# Patient Record
Sex: FEMALE | Race: BLACK OR AFRICAN AMERICAN | NOT HISPANIC OR LATINO
[De-identification: names, ages, dates, MRNs, and addresses within clinical notes are randomized per-mention and may not be internally consistent; named-entity substitution may affect disease eponyms.]

---

## 2020-05-07 ENCOUNTER — APPOINTMENT (OUTPATIENT)
Dept: PULMONOLOGY | Facility: CLINIC | Age: 65
End: 2020-05-07

## 2020-07-20 PROBLEM — Z00.00 ENCOUNTER FOR PREVENTIVE HEALTH EXAMINATION: Status: ACTIVE | Noted: 2020-07-20

## 2020-07-22 ENCOUNTER — APPOINTMENT (OUTPATIENT)
Dept: PULMONOLOGY | Facility: CLINIC | Age: 65
End: 2020-07-22

## 2020-08-08 ENCOUNTER — LABORATORY RESULT (OUTPATIENT)
Age: 65
End: 2020-08-08

## 2020-08-12 ENCOUNTER — APPOINTMENT (OUTPATIENT)
Dept: PULMONOLOGY | Facility: CLINIC | Age: 65
End: 2020-08-12
Payer: COMMERCIAL

## 2020-08-12 VITALS
HEART RATE: 92 BPM | HEIGHT: 62 IN | WEIGHT: 184 LBS | TEMPERATURE: 98.3 F | SYSTOLIC BLOOD PRESSURE: 118 MMHG | DIASTOLIC BLOOD PRESSURE: 78 MMHG | BODY MASS INDEX: 33.86 KG/M2 | OXYGEN SATURATION: 98 %

## 2020-08-12 DIAGNOSIS — Z86.59 PERSONAL HISTORY OF OTHER MENTAL AND BEHAVIORAL DISORDERS: ICD-10-CM

## 2020-08-12 DIAGNOSIS — Z87.19 PERSONAL HISTORY OF OTHER DISEASES OF THE DIGESTIVE SYSTEM: ICD-10-CM

## 2020-08-12 DIAGNOSIS — Z82.5 FAMILY HISTORY OF ASTHMA AND OTHER CHRONIC LOWER RESPIRATORY DISEASES: ICD-10-CM

## 2020-08-12 DIAGNOSIS — Z91.09 OTHER ALLERGY STATUS, OTHER THAN TO DRUGS AND BIOLOGICAL SUBSTANCES: ICD-10-CM

## 2020-08-12 PROCEDURE — 94010 BREATHING CAPACITY TEST: CPT

## 2020-08-12 PROCEDURE — 99205 OFFICE O/P NEW HI 60 MIN: CPT | Mod: 25

## 2020-08-12 RX ORDER — AZELASTINE HYDROCHLORIDE 205.5 UG/1
0.15 SPRAY, METERED NASAL
Qty: 1 | Refills: 5 | Status: ACTIVE | COMMUNITY
Start: 2020-08-12 | End: 1900-01-01

## 2020-08-12 RX ORDER — CALCIUM CARBONATE 500(1250)
TABLET,CHEWABLE ORAL
Refills: 0 | Status: ACTIVE | COMMUNITY

## 2020-08-12 RX ORDER — LEVOCETIRIZINE DIHYDROCHLORIDE 5 MG/1
5 TABLET ORAL
Qty: 30 | Refills: 5 | Status: ACTIVE | COMMUNITY
Start: 2020-08-12 | End: 1900-01-01

## 2020-08-12 NOTE — ASSESSMENT
[FreeTextEntry1] : Data reviewed:\par \par PA/lat CXR LHR 2/2020 personally reviewed : clear lungs\par \par [place] 08/12/2020 : poor quality but grossly normal\par \par Impression:\par Nasal congestion, allergies\par Nocturnal symptoms due to above vs asthma vs RJ\par No evidence of PH\par \par Plan:\par The first step here seems to be to put her back on Xyzal, and I will add Astelin, to treat her nasal symptoms.\par If that doesn't relieve her nocturnal symptoms, then I can bring her back for full PFT/FENO and also do a HST.\par If there is any further information suggesting PH, would ask that the referring physician forward it to me. I see no evidence that she has PH. She has excellent exercise tolerance and reports an echo.

## 2020-08-12 NOTE — CONSULT LETTER
[Dear  ___] : Dear  [unfilled], [( Thank you for referring [unfilled] for consultation for _____ )] : Thank you for referring [unfilled] for consultation for [unfilled] [Please see my note below.] : Please see my note below. [Consult Closing:] : Thank you very much for allowing me to participate in the care of this patient.  If you have any questions, please do not hesitate to contact me. [Sincerely,] : Sincerely, [FreeTextEntry3] : Yuli Bales MD, FCCP\par  [FreeTextEntry2] : Caren Boss MD\par [address] [facility]\par [place] [id number]

## 2020-08-12 NOTE — PHYSICAL EXAM
[No Acute Distress] : no acute distress [Normal Appearance] : normal appearance [Normal S1, S2] : normal s1, s2 [Normal Rate/Rhythm] : normal rate/rhythm [No Resp Distress] : no resp distress [No Murmurs] : no murmurs [Benign] : benign [Clear to Auscultation Bilaterally] : clear to auscultation bilaterally [No Edema] : no edema [No Clubbing] : no clubbing [Normal Color/ Pigmentation] : normal color/ pigmentation [Normal Affect] : normal affect

## 2020-08-12 NOTE — HISTORY OF PRESENT ILLNESS
[TextBox_4] : 08/12/2020: Asked to evaluate patient by Dr Boss ([address] [place] [address] in [place]) for pulmonary hypertension. No records are provided; no indication of why she is thought to have PH. Her predominant complaint is of nasal congestion; she's previously seen ENT and received IT for her allergies as well as Xyzal. She did not tolerate Flonase. She complains of epigastric pressure and a sensation of trouble breathing inside her throat. She will sometimes wake up in the night with nasal congestion and dyspnea. She did have an echo and stress test w Dr Troncoso and was told that these were fine. She walks all over the city without dyspnea, she climbs stairs without dyspnea, she exercises. She does have reflux and a hiatal hernia. She will cough occasionally, doesn't wheeze. No childhood asthma. Smoked some until her 30s. Has vaped last year. Lots of anxiety, mom is very sick.\par

## 2021-03-15 ENCOUNTER — LABORATORY RESULT (OUTPATIENT)
Age: 66
End: 2021-03-15

## 2021-03-16 ENCOUNTER — APPOINTMENT (OUTPATIENT)
Dept: PULMONOLOGY | Facility: CLINIC | Age: 66
End: 2021-03-16
Payer: COMMERCIAL

## 2021-03-16 VITALS
HEART RATE: 100 BPM | WEIGHT: 200 LBS | BODY MASS INDEX: 36.8 KG/M2 | HEIGHT: 62 IN | TEMPERATURE: 98.7 F | OXYGEN SATURATION: 98 % | SYSTOLIC BLOOD PRESSURE: 118 MMHG | DIASTOLIC BLOOD PRESSURE: 88 MMHG

## 2021-03-16 PROCEDURE — 94727 GAS DIL/WSHOT DETER LNG VOL: CPT

## 2021-03-16 PROCEDURE — 94060 EVALUATION OF WHEEZING: CPT

## 2021-03-16 PROCEDURE — 94729 DIFFUSING CAPACITY: CPT

## 2021-03-16 PROCEDURE — 99072 ADDL SUPL MATRL&STAF TM PHE: CPT

## 2021-03-16 PROCEDURE — 99213 OFFICE O/P EST LOW 20 MIN: CPT | Mod: 25

## 2021-03-16 PROCEDURE — 95012 NITRIC OXIDE EXP GAS DETER: CPT

## 2021-03-16 RX ORDER — BUSPIRONE HYDROCHLORIDE 15 MG/1
15 TABLET ORAL
Qty: 180 | Refills: 0 | Status: ACTIVE | COMMUNITY
Start: 2021-01-29

## 2021-03-16 RX ORDER — FAMOTIDINE 40 MG/1
40 TABLET, FILM COATED ORAL
Qty: 30 | Refills: 0 | Status: ACTIVE | COMMUNITY
Start: 2020-10-01

## 2021-03-16 RX ORDER — BUSPIRONE HYDROCHLORIDE 10 MG/1
10 TABLET ORAL
Qty: 90 | Refills: 0 | Status: ACTIVE | COMMUNITY
Start: 2020-09-23

## 2021-03-16 NOTE — CONSULT LETTER
[Dear  ___] : Dear  [unfilled], [Courtesy Letter:] : I had the pleasure of seeing your patient, [unfilled], in my office today. [Please see my note below.] : Please see my note below. [Consult Closing:] : Thank you very much for allowing me to participate in the care of this patient.  If you have any questions, please do not hesitate to contact me. [Sincerely,] : Sincerely, [FreeTextEntry2] : Caren Boss MD\par [address] [facility]\par [place] [id number] [FreeTextEntry3] : Yuli Bales MD, FCCP\par

## 2021-03-16 NOTE — ASSESSMENT
[FreeTextEntry1] : Data reviewed:\par \par PA/lat CXR LHR 2/2020 personally reviewed : clear lungs\par \par [place] 08/12/2020 : poor quality but grossly normal\par PFT 3/16/21: entirely normal / FENO 13 (on fluticasone 113mcg about 4 doses in past week)\par \par Impression:\par Dyspnea - due to anxiety?\par Nasal congestion, allergies\par \par Plan:\par Fine to trial regular use of Flovent; I discussed w her how to use this.\par But there is no evidence today that she has asthma and the timing right after her mother's death makes it seem that this may be anxiety/depression rather than lung disease.\par If symptoms fail to improve I will do a MCT to exclude asthma. She will need to be off inhalers.

## 2021-03-16 NOTE — HISTORY OF PRESENT ILLNESS
[TextBox_4] : 08/12/2020: Asked to evaluate patient by Dr Boss ([address] [place] [address] in [place]) for pulmonary hypertension. No records are provided; no indication of why she is thought to have PH. Her predominant complaint is of nasal congestion; she's previously seen ENT and received IT for her allergies as well as Xyzal. She did not tolerate Flonase. She complains of epigastric pressure and a sensation of trouble breathing inside her throat. She will sometimes wake up in the night with nasal congestion and dyspnea. She did have an echo and stress test w Dr Troncoso and was told that these were fine. She walks all over the city without dyspnea, she climbs stairs without dyspnea, she exercises. She does have reflux and a hiatal hernia. She will cough occasionally, doesn't wheeze. No childhood asthma. Smoked some until her 30s. Has vaped last year. Lots of anxiety, mom is very sick.\par \par 3/16/21: Is intermittently dyspneic w no clear provocation that she can identify. May be short lived or last all day. Doesn't have symptoms today. This is new since she saw me last and of note she lost her mom in September and cousin here recently, under a lot of stress, identified w depression. Her allergist put her on fluticasone 113mcg which she's used about 4x in past week, and albuterol, which provides some relief but not quickly.\par

## 2021-03-30 ENCOUNTER — TRANSCRIPTION ENCOUNTER (OUTPATIENT)
Age: 66
End: 2021-03-30

## 2021-04-02 ENCOUNTER — LABORATORY RESULT (OUTPATIENT)
Age: 66
End: 2021-04-02

## 2021-04-05 ENCOUNTER — NON-APPOINTMENT (OUTPATIENT)
Age: 66
End: 2021-04-05

## 2021-04-05 ENCOUNTER — APPOINTMENT (OUTPATIENT)
Dept: PULMONOLOGY | Facility: CLINIC | Age: 66
End: 2021-04-05
Payer: COMMERCIAL

## 2021-04-05 VITALS
DIASTOLIC BLOOD PRESSURE: 70 MMHG | SYSTOLIC BLOOD PRESSURE: 110 MMHG | BODY MASS INDEX: 36.25 KG/M2 | OXYGEN SATURATION: 99 % | WEIGHT: 197 LBS | HEART RATE: 103 BPM | TEMPERATURE: 98.3 F | HEIGHT: 62 IN

## 2021-04-05 PROCEDURE — 99213 OFFICE O/P EST LOW 20 MIN: CPT | Mod: 25

## 2021-04-05 PROCEDURE — 94070 EVALUATION OF WHEEZING: CPT

## 2021-04-05 PROCEDURE — 95070 INHLJ BRNCL CHALLENGE TSTG: CPT

## 2021-04-05 PROCEDURE — 99072 ADDL SUPL MATRL&STAF TM PHE: CPT

## 2021-04-05 NOTE — CONSULT LETTER
[Dear  ___] : Dear  [unfilled], [Courtesy Letter:] : I had the pleasure of seeing your patient, [unfilled], in my office today. [Please see my note below.] : Please see my note below. [Consult Closing:] : Thank you very much for allowing me to participate in the care of this patient.  If you have any questions, please do not hesitate to contact me. [Sincerely,] : Sincerely, [FreeTextEntry2] : Caren Boss MD\par [address] [facility]\par [place] [id number] [FreeTextEntry3] : Yuli Bales MD, FCCP\par

## 2021-04-05 NOTE — HISTORY OF PRESENT ILLNESS
[TextBox_4] : 08/12/2020: Asked to evaluate patient by Dr Boss ([address] [place] [address] in [place]) for pulmonary hypertension. No records are provided; no indication of why she is thought to have PH. Her predominant complaint is of nasal congestion; she's previously seen ENT and received IT for her allergies as well as Xyzal. She did not tolerate Flonase. She complains of epigastric pressure and a sensation of trouble breathing inside her throat. She will sometimes wake up in the night with nasal congestion and dyspnea. She did have an echo and stress test w Dr Troncoso and was told that these were fine. She walks all over the city without dyspnea, she climbs stairs without dyspnea, she exercises. She does have reflux and a hiatal hernia. She will cough occasionally, doesn't wheeze. No childhood asthma. Smoked some until her 30s. Has vaped last year. Lots of anxiety, mom is very sick.\par \par 3/16/21: Is intermittently dyspneic w no clear provocation that she can identify. May be short lived or last all day. Doesn't have symptoms today. This is new since she saw me last and of note she lost her mom in September and cousin here recently, under a lot of stress, identified w depression. Her allergist put her on fluticasone 113mcg which she's used about 4x in past week, and albuterol, which provides some relief but not quickly.\par \par 4/5/21: Comes back for MCT. Held Flovent for 5 days (with no worsening of symptoms) and albuterol today and test negative. Complains of daily epigastric pressure sensation that takes her breath away and may last from minutes to all day. Makes her nervous, heart beats fast. Make also wake her from sleep but this is rare, less than once a week, just occasionally. Is having her springtime allergy symptoms. Has reflux meds but is not taking them.

## 2021-04-05 NOTE — ASSESSMENT
[FreeTextEntry1] : Data reviewed:\par \par PA/lat CXR LHR 2/2020 personally reviewed : clear lungs\par \par [place] 08/12/2020 : poor quality but grossly normal\par PFT 3/16/21: entirely normal / FENO 13 (on fluticasone 113mcg about 4 doses in past week)\par MCT 4/5/21: negative for BHR\par \par Impression:\par Dyspnea, epigastric pressure\par Nasal congestion, allergies\par Reflux, hiatal hernia\par \par Plan:\par The methacholine challenge test off meds excludes asthma.\par I am not sure if her symptoms represent anxiety (but that would seem to be unlikely to wake her from sleep) or reflux. There is no explanatory lung disease. I asked her to please follow up w GI to evaluate her reflux and hiatal hernia.

## 2022-10-20 ENCOUNTER — NON-APPOINTMENT (OUTPATIENT)
Age: 67
End: 2022-10-20

## 2022-10-25 ENCOUNTER — NON-APPOINTMENT (OUTPATIENT)
Age: 67
End: 2022-10-25

## 2022-11-11 ENCOUNTER — APPOINTMENT (OUTPATIENT)
Dept: GYNECOLOGIC ONCOLOGY | Facility: CLINIC | Age: 67
End: 2022-11-11

## 2022-11-11 ENCOUNTER — NON-APPOINTMENT (OUTPATIENT)
Age: 67
End: 2022-11-11

## 2022-11-11 VITALS
HEIGHT: 62 IN | HEART RATE: 81 BPM | BODY MASS INDEX: 36.25 KG/M2 | SYSTOLIC BLOOD PRESSURE: 130 MMHG | OXYGEN SATURATION: 98 % | DIASTOLIC BLOOD PRESSURE: 73 MMHG | WEIGHT: 197 LBS | TEMPERATURE: 97.6 F

## 2022-11-11 PROCEDURE — 99205 OFFICE O/P NEW HI 60 MIN: CPT

## 2022-11-14 ENCOUNTER — APPOINTMENT (OUTPATIENT)
Dept: PULMONOLOGY | Facility: CLINIC | Age: 67
End: 2022-11-14

## 2022-11-14 NOTE — REVIEW OF SYSTEMS
[Negative] : Musculoskeletal [FreeTextEntry4] : +pelvic discomfort maybe from constipation  [de-identified] : +constipation

## 2022-11-14 NOTE — CHIEF COMPLAINT
[FreeTextEntry1] : 66 y/o referred from JORGE ALBERTO Pabon for growing fibroids. Pt denies any PMB. She reports her LMP was . She admits to pelvic discomfort but states it could be due to constipation. She is being seen by GI doctor and is trying to resolve the constipation with colace, Benefiber and senna. Her current bowel regimen is about every other day. \par \par LMP: \par OBHX:  x 1, C/S x 1\par GYNHX:  denies hx of abnormal pap smears\par \par PMHX: acid reflux, constipation and vertigo , PCA followed by Cardiologist Dr. Mara Troncoso\par SX:C/S, BTL\par \par MED: colace, meclizine, benefiber and senna \par ALL: NKDA \par \par SOCIAL: retired, social alcohol use, denies any drug/cigarette \par FAMHX: Brother- diagnosed in his 50s- colon cancer, Maternal aunt- Leukemia\par \par Health Maintenance\par Last pap: 10/31/2022- negative as per patient \par Last mammogram:\par Last colonoscopy:\par

## 2022-11-14 NOTE — ASSESSMENT
[FreeTextEntry1] : I discussed with the patient with the aid of diagrams, reviewed the findings on history and physical examination, and reviewed the imaging studies in detail. We discussed that is difficult to discern between benign fibroids and malignant uterine masses on imaging studies.  We also discussed the possibility of atypical leiomyomas and sarcoma. The risk of sarcoma in patients presenting for fibroid surgery is between 1:350-1:1000. In her case, the appearance of a "new fibroid" and an enlarging uterine mass are both concerning. I explained that the EMBx only samples the uterine lining and a normal biopsy DOES NOT r/o uterine sarcoma. \par \par Surgical approach of hysterectomy also discussed- including minimally invasive and open approaches. Minimally invasive approach will be attempted if appropriate, however if the size of the uterus/fibroids exceed that which is appropriate laparoscopically, an open abdominal approach will be selected.\par \par Complications that include, but are not limited to: bleeding, infection, injury to other organs including bowel, bladder, ureters, blood vessels, nerves; infections, blood clots, lymphedema, pneumonia, wound complications and prolonged hospital stay have all been discussed with the patient. Whenever minimally invasive surgery is attempted, there is a chance of needing to convert to laparotomy. The risk of occult injury requiring additional surgery also discussed. I have also provided her with the diagrams.  \par \par Patient is not currently interested in surgery and was very surprised by my recommendations. I explained that in the setting of her declining surgery, my recommendation would be an MRI now and repeat imaging in 3 months. Patient is agreeable to this. All questions answered.\par \par [] F/U EMBx\par [] MRI Pelvis now\par [] TVS in 3 months

## 2022-11-14 NOTE — PHYSICAL EXAM
[Normal] : Anus and perineum: Normal sphincter tone, no masses, no prolapse. [de-identified] : vertical skin incision from c/s with puckering

## 2022-12-16 ENCOUNTER — APPOINTMENT (OUTPATIENT)
Dept: GYNECOLOGIC ONCOLOGY | Facility: CLINIC | Age: 67
End: 2022-12-16

## 2022-12-16 DIAGNOSIS — D25.9 LEIOMYOMA OF UTERUS, UNSPECIFIED: ICD-10-CM

## 2022-12-16 PROCEDURE — 99214 OFFICE O/P EST MOD 30 MIN: CPT | Mod: 95

## 2022-12-16 NOTE — REASON FOR VISIT
[Home] : at home, [unfilled] , at the time of the visit. [Medical Office: ([facility])___] : at the medical office located in  [Patient] : the patient [Self] : self [FreeTextEntry1] : Patient head several questions about surgery and requested a telehealth visit. \par \par She wanted to know how long the surgery would last. She wanted to know if there were alternates to surgery. I once again discussed conservative vs. surgical management of her uterine mass. I once again explained that the only way to r/o sarcoma is with a tissue diagnosis. \par \par We discussed pelvic pain in detail. I explained that pelvic pain is multifactorial. Surgery will not necessarily relieve all of her discomfort.\par \par Patient continues to be very undecided about surgery. She will have MRI in January and will follow up with me after. \par

## 2022-12-16 NOTE — PHYSICAL EXAM
[Normal] : General appearance: No acute distress, well appearing and well nourished [de-identified] : Telehealth

## 2022-12-16 NOTE — ASSESSMENT
[FreeTextEntry1] : Patient will have MRI in January and will decide conservative vs. surgical at that time. She has a surgical date scheduled.\par \par All questions answered to the patient's apparent satisfaction. \par \par [] MRI January\par

## 2023-01-25 ENCOUNTER — APPOINTMENT (OUTPATIENT)
Dept: PULMONOLOGY | Facility: CLINIC | Age: 68
End: 2023-01-25
Payer: MEDICARE

## 2023-01-25 VITALS
HEART RATE: 90 BPM | OXYGEN SATURATION: 100 % | TEMPERATURE: 97.5 F | HEIGHT: 62 IN | SYSTOLIC BLOOD PRESSURE: 143 MMHG | WEIGHT: 204 LBS | BODY MASS INDEX: 37.54 KG/M2 | DIASTOLIC BLOOD PRESSURE: 78 MMHG

## 2023-01-25 DIAGNOSIS — R63.5 ABNORMAL WEIGHT GAIN: ICD-10-CM

## 2023-01-25 DIAGNOSIS — R06.83 SNORING: ICD-10-CM

## 2023-01-25 PROCEDURE — 99204 OFFICE O/P NEW MOD 45 MIN: CPT

## 2023-01-25 NOTE — HISTORY OF PRESENT ILLNESS
[FreeTextEntry1] : 01/25/2023 :  HAILEY VELASQUEZ is a 67 year old  retired ,  former smoker ( quit 83) female with PMHx anxiety/ depression not taking any meds,  PASc since 2019, GERD who is here for initial evaluation of possible sleep apnea \par \par She was evaluated by her cardiologist for PACs and was sent to us for further evaluation and possible sleep apnea\par \par She reports loud snoring but is unsure abut apnea since she is living alone. She also reports dry mouth \par and gaining WT ~ 30lb x 2 years. Her brother has RJ on CPAP.  \par \par \par Sleep Routine:\par \par She goes to bed at after 12A, sleep latency is about 5min, she wakes up once WASO varies, and then she is up at 6A. She does not nap . Her  ESS is 8/24. \par \par \par She denies cataplexy, RLS, parasomnia, or any other sleep behavioral issues. Also frequent sx of .GERD. \par \par  \par \par

## 2023-01-25 NOTE — ASSESSMENT
[FreeTextEntry1] : 66 y/o F with c/o loud snoring and PACs since 2019 and negative cardiac w/u who is here for initial visit. Frequent GERD (often seen with RJ). \par Based on history and physical exam, sleep disordered breathing is at least moderately likely.  The patient was advised to have overnight polysomnography, and will be seen in follow up after testing.

## 2023-01-26 ENCOUNTER — APPOINTMENT (OUTPATIENT)
Dept: GYNECOLOGIC ONCOLOGY | Facility: HOSPITAL | Age: 68
End: 2023-01-26

## 2023-02-08 ENCOUNTER — NON-APPOINTMENT (OUTPATIENT)
Age: 68
End: 2023-02-08
